# Patient Record
Sex: MALE | Race: WHITE | NOT HISPANIC OR LATINO | URBAN - METROPOLITAN AREA
[De-identification: names, ages, dates, MRNs, and addresses within clinical notes are randomized per-mention and may not be internally consistent; named-entity substitution may affect disease eponyms.]

---

## 2018-07-24 ENCOUNTER — EMERGENCY (EMERGENCY)
Facility: HOSPITAL | Age: 43
LOS: 1 days | Discharge: HOME | End: 2018-07-24
Attending: EMERGENCY MEDICINE | Admitting: EMERGENCY MEDICINE
Payer: COMMERCIAL

## 2018-07-24 VITALS
OXYGEN SATURATION: 100 % | HEART RATE: 71 BPM | SYSTOLIC BLOOD PRESSURE: 136 MMHG | RESPIRATION RATE: 16 BRPM | DIASTOLIC BLOOD PRESSURE: 90 MMHG

## 2018-07-24 VITALS
DIASTOLIC BLOOD PRESSURE: 98 MMHG | SYSTOLIC BLOOD PRESSURE: 168 MMHG | RESPIRATION RATE: 16 BRPM | TEMPERATURE: 98 F | HEART RATE: 83 BPM | OXYGEN SATURATION: 99 %

## 2018-07-24 LAB
ALBUMIN SERPL ELPH-MCNC: 4 G/DL — SIGNIFICANT CHANGE UP (ref 3.3–5)
ALP SERPL-CCNC: 62 U/L — SIGNIFICANT CHANGE UP (ref 40–120)
ALT FLD-CCNC: 23 U/L — SIGNIFICANT CHANGE UP (ref 4–41)
APTT BLD: 31.2 SEC — SIGNIFICANT CHANGE UP (ref 27.5–37.4)
AST SERPL-CCNC: 33 U/L — SIGNIFICANT CHANGE UP (ref 4–40)
BASOPHILS # BLD AUTO: 0.02 K/UL — SIGNIFICANT CHANGE UP (ref 0–0.2)
BASOPHILS NFR BLD AUTO: 0.3 % — SIGNIFICANT CHANGE UP (ref 0–2)
BILIRUB SERPL-MCNC: 0.4 MG/DL — SIGNIFICANT CHANGE UP (ref 0.2–1.2)
BLD GP AB SCN SERPL QL: NEGATIVE — SIGNIFICANT CHANGE UP
BUN SERPL-MCNC: 11 MG/DL — SIGNIFICANT CHANGE UP (ref 7–23)
CALCIUM SERPL-MCNC: 9.4 MG/DL — SIGNIFICANT CHANGE UP (ref 8.4–10.5)
CHLORIDE SERPL-SCNC: 100 MMOL/L — SIGNIFICANT CHANGE UP (ref 98–107)
CO2 SERPL-SCNC: 27 MMOL/L — SIGNIFICANT CHANGE UP (ref 22–31)
CREAT SERPL-MCNC: 1.15 MG/DL — SIGNIFICANT CHANGE UP (ref 0.5–1.3)
EOSINOPHIL # BLD AUTO: 0.17 K/UL — SIGNIFICANT CHANGE UP (ref 0–0.5)
EOSINOPHIL NFR BLD AUTO: 2.9 % — SIGNIFICANT CHANGE UP (ref 0–6)
GLUCOSE SERPL-MCNC: 85 MG/DL — SIGNIFICANT CHANGE UP (ref 70–99)
HCT VFR BLD CALC: 42.9 % — SIGNIFICANT CHANGE UP (ref 39–50)
HGB BLD-MCNC: 14.8 G/DL — SIGNIFICANT CHANGE UP (ref 13–17)
IMM GRANULOCYTES # BLD AUTO: 0.02 # — SIGNIFICANT CHANGE UP
IMM GRANULOCYTES NFR BLD AUTO: 0.3 % — SIGNIFICANT CHANGE UP (ref 0–1.5)
INR BLD: 0.97 — SIGNIFICANT CHANGE UP (ref 0.88–1.17)
LYMPHOCYTES # BLD AUTO: 1.33 K/UL — SIGNIFICANT CHANGE UP (ref 1–3.3)
LYMPHOCYTES # BLD AUTO: 23.1 % — SIGNIFICANT CHANGE UP (ref 13–44)
MCHC RBC-ENTMCNC: 30.6 PG — SIGNIFICANT CHANGE UP (ref 27–34)
MCHC RBC-ENTMCNC: 34.5 % — SIGNIFICANT CHANGE UP (ref 32–36)
MCV RBC AUTO: 88.8 FL — SIGNIFICANT CHANGE UP (ref 80–100)
MONOCYTES # BLD AUTO: 0.55 K/UL — SIGNIFICANT CHANGE UP (ref 0–0.9)
MONOCYTES NFR BLD AUTO: 9.5 % — SIGNIFICANT CHANGE UP (ref 2–14)
NEUTROPHILS # BLD AUTO: 3.68 K/UL — SIGNIFICANT CHANGE UP (ref 1.8–7.4)
NEUTROPHILS NFR BLD AUTO: 63.9 % — SIGNIFICANT CHANGE UP (ref 43–77)
NRBC # FLD: 0 — SIGNIFICANT CHANGE UP
PLATELET # BLD AUTO: 202 K/UL — SIGNIFICANT CHANGE UP (ref 150–400)
PMV BLD: 8.8 FL — SIGNIFICANT CHANGE UP (ref 7–13)
POTASSIUM SERPL-MCNC: 4.3 MMOL/L — SIGNIFICANT CHANGE UP (ref 3.5–5.3)
POTASSIUM SERPL-SCNC: 4.3 MMOL/L — SIGNIFICANT CHANGE UP (ref 3.5–5.3)
PROT SERPL-MCNC: 7 G/DL — SIGNIFICANT CHANGE UP (ref 6–8.3)
PROTHROM AB SERPL-ACNC: 11.1 SEC — SIGNIFICANT CHANGE UP (ref 9.8–13.1)
RBC # BLD: 4.83 M/UL — SIGNIFICANT CHANGE UP (ref 4.2–5.8)
RBC # FLD: 13.2 % — SIGNIFICANT CHANGE UP (ref 10.3–14.5)
RH IG SCN BLD-IMP: NEGATIVE — SIGNIFICANT CHANGE UP
SODIUM SERPL-SCNC: 141 MMOL/L — SIGNIFICANT CHANGE UP (ref 135–145)
WBC # BLD: 5.77 K/UL — SIGNIFICANT CHANGE UP (ref 3.8–10.5)
WBC # FLD AUTO: 5.77 K/UL — SIGNIFICANT CHANGE UP (ref 3.8–10.5)

## 2018-07-24 PROCEDURE — 76870 US EXAM SCROTUM: CPT | Mod: 26

## 2018-07-24 PROCEDURE — 99285 EMERGENCY DEPT VISIT HI MDM: CPT

## 2018-07-24 PROCEDURE — 99283 EMERGENCY DEPT VISIT LOW MDM: CPT

## 2018-07-24 NOTE — CONSULT NOTE ADULT - ASSESSMENT
43yoM with L hematocele s/p genital trauma 2/2 suction 2 weeks ago    -Awaiting call back from Dr. Hdz to ensure no further imaging is needed at this time 43yoM with L hematocele s/p genital trauma 2/2 suction 2 weeks ago    -No further imaging needed at this time  -Continue conservative management  -Antiinflammatories/pain meds as tolerated (patient has gastric ulcer)  -Scrotal support/scrotal elevation  -Follow up as outpatient with Dr. Hdz [phone] 43yoM with L hematocele s/p genital trauma 2/2 suction 2 weeks ago. no evidence of torsion based on blood flow observed in both testicles by sonogram    -No further imaging needed at this time  -Continue conservative management  -Antiinflammatories/pain meds as tolerated (patient has gastric ulcer)  -Scrotal support/scrotal elevation  -Follow up as outpatient with Dr. Hdz [phone]

## 2018-07-24 NOTE — ED ADULT TRIAGE NOTE - CHIEF COMPLAINT QUOTE
Pt states that [place] by urology and sent for US, urology feels like there is no blood flow to left testicle.  Pt states that he has no feeling in testicle

## 2018-07-24 NOTE — ED PROVIDER NOTE - MEDICAL DECISION MAKING DETAILS
kelsey: trauma to scrotum approx 2 weeks ago, seeking care now. large firm tender left testicle. bedside US requested.

## 2018-07-24 NOTE — CONSULT NOTE ADULT - SUBJECTIVE AND OBJECTIVE BOX
Patient is a 43yoM with no prior PMHx who presents today for evaluation after testicular trauma 2 weeks ago.  Patient states he was on his honeymoon in [place], and while swimming, the entirety of his penis and scrotum was sucked into the pool intake filter, and was stuck there for several minutes with suction.      Patient was seen by a physician in [place] who recommended scrotal exploration, but the patient refused.  He states he called a local urologist today, who told him to present to the ER out of concern for a dead testicle.  Patient has several pictures of the scrotum from immediately after the injury.  Pictures reviewed, significant scrotal swelling and ecchymosis.    U/s performed today demonstrates homogenous echotexture of the b/l testicles with normal blood flow, large L scrotal hematocele.    PAST MEDICAL & SURGICAL HISTORY: None    FAMILY HISTORY: Noncontributory    No Known Allergies    REVIEW OF SYSTEMS: Pertinent positives and negatives as stated in HPI, otherwise negative    Vital signs  T(C): 36.9, Max: 36.9 (07-24 @ 11:17)  HR: 71  BP: 136/90  SpO2: 100%    Physical Exam  Gen: NAD  Pulm: No respiratory distress, no subcostal retractions  CV: RRR, no JVD  : Patient refused penile exam.  L hemiscrotum markedly swollen compared to R, nontender, no ecchymosis.  Marked improvement when compared to pictures taken immediately after the incident.    LABS:  WBC 5.77  / Hct 42.9  / SCr 1.16     07-24  141  |  100  |  11  ----------------------------<  85  4.3   |  27  |  1.15    Ca    9.4      24 Jul 2018 13:01    TPro  7.0  /  Alb  4.0  /  TBili  0.4  /  DBili  x   /  AST  33  /  ALT  23  /  AlkPhos  62  07-24    PT/INR - ( 24 Jul 2018 12:57 )   PT: 11.1 SEC;   INR: 0.97      PTT - ( 24 Jul 2018 12:57 )  PTT:31.2 SEC    RADIOLOGY:  RIGHT:  Right testis: 4.0 x 3.0 x 2.7 cm. Normal echogenicity and echotexture   with no masses or areas of architectural distortion. Normal blood flow   pattern.  Right epididymis: Within normal limits.  Right hydrocele: None.  Right varicocele: None.      LEFT:   Left testis: 3.8 x 2.1 x 2.3 cm. Normal echogenicity and echotexture with   no masses or areas of architectural distortion. Normal blood flow pattern.  Left epididymis: Within normal limits.  Left hydrocele: Large  Left varicocele: None.  5.6 x 3.7 x 6.4 cm left scrotal hematoma is noted.    IMPRESSION:   No testicular torsion or laceration.  Large left scrotal hematoma. Patient is a 43yoM with no prior PMHx aside from self-reported gastric ulcers who presents today for evaluation after testicular trauma 2 weeks ago.  Patient states he was on his honeymoon in [place], and while swimming, the entirety of his penis and scrotum was sucked into the pool intake filter, and was stuck there for several minutes with suction.      Patient was seen by a physician in [place] who recommended scrotal exploration, but the patient refused.  He states he called a local urologist today, who told him to present to the ER out of concern for a dead testicle.  Patient has several pictures of the scrotum from immediately after the injury.  Pictures reviewed, significant scrotal swelling and ecchymosis.    U/s performed today demonstrates homogenous echotexture of the b/l testicles with normal blood flow, large L scrotal hematocele.    PAST MEDICAL & SURGICAL HISTORY: None    FAMILY HISTORY: Noncontributory    No Known Allergies    REVIEW OF SYSTEMS: Pertinent positives and negatives as stated in HPI, otherwise negative    Vital signs  T(C): 36.9, Max: 36.9 (07-24 @ 11:17)  HR: 71  BP: 136/90  SpO2: 100%    Physical Exam  Gen: NAD  Pulm: No respiratory distress, no subcostal retractions  CV: RRR, no JVD  : Patient refused penile exam.  L hemiscrotum markedly swollen compared to R, nontender, no ecchymosis.  Marked improvement when compared to pictures taken immediately after the incident.    LABS:  WBC 5.77  / Hct 42.9  / SCr 1.16     07-24  141  |  100  |  11  ----------------------------<  85  4.3   |  27  |  1.15    Ca    9.4      24 Jul 2018 13:01    TPro  7.0  /  Alb  4.0  /  TBili  0.4  /  DBili  x   /  AST  33  /  ALT  23  /  AlkPhos  62  07-24    PT/INR - ( 24 Jul 2018 12:57 )   PT: 11.1 SEC;   INR: 0.97      PTT - ( 24 Jul 2018 12:57 )  PTT:31.2 SEC    RADIOLOGY:  RIGHT:  Right testis: 4.0 x 3.0 x 2.7 cm. Normal echogenicity and echotexture   with no masses or areas of architectural distortion. Normal blood flow   pattern.  Right epididymis: Within normal limits.  Right hydrocele: None.  Right varicocele: None.      LEFT:   Left testis: 3.8 x 2.1 x 2.3 cm. Normal echogenicity and echotexture with   no masses or areas of architectural distortion. Normal blood flow pattern.  Left epididymis: Within normal limits.  Left hydrocele: Large  Left varicocele: None.  5.6 x 3.7 x 6.4 cm left scrotal hematoma is noted.    IMPRESSION:   No testicular torsion or laceration.  Large left scrotal hematoma.

## 2018-07-24 NOTE — ED PROVIDER NOTE - OBJECTIVE STATEMENT
kelsey: ely from pt. July 7 2018 while in [place], scrotum was trapped in an intake filter with suction for several minutes. progressively worsening pain, swelling and discoloration (pictures reviewed). Had a physician visit his hotel room twice; recc' going to hosp to cut the region'. pt refused. returned to [place] 3 [place] ago. called a urologist who recc that pt go to ED.  pmh unremarkable

## 2018-08-01 ENCOUNTER — EMERGENCY (EMERGENCY)
Facility: HOSPITAL | Age: 43
LOS: 1 days | Discharge: LEFT BEFORE DISCHARGE | End: 2018-08-01
Attending: EMERGENCY MEDICINE | Admitting: EMERGENCY MEDICINE
Payer: COMMERCIAL

## 2018-08-01 VITALS
RESPIRATION RATE: 16 BRPM | OXYGEN SATURATION: 100 % | SYSTOLIC BLOOD PRESSURE: 160 MMHG | TEMPERATURE: 100 F | HEART RATE: 89 BPM | DIASTOLIC BLOOD PRESSURE: 100 MMHG

## 2018-08-01 PROCEDURE — 99285 EMERGENCY DEPT VISIT HI MDM: CPT

## 2018-08-01 PROCEDURE — 76870 US EXAM SCROTUM: CPT | Mod: 26

## 2018-08-01 RX ORDER — IBUPROFEN 200 MG
800 TABLET ORAL ONCE
Qty: 0 | Refills: 0 | Status: COMPLETED | OUTPATIENT
Start: 2018-08-01 | End: 2018-08-01

## 2018-08-01 RX ADMIN — Medication 800 MILLIGRAM(S): at 23:11

## 2018-08-01 NOTE — ED PROVIDER NOTE - PROGRESS NOTE DETAILS
Bray: ua clean.  pending sono read.  pt states motrin helped with sx.  s/o to dr jaffe to f/u on sono read of testi and re-assess. likely dc home with viral syndrome. Received signout from Dr. jaffe pending urology consult.  Went to reevaluate, pt not in room.  Eloped.

## 2018-08-01 NOTE — ED ADULT TRIAGE NOTE - CHIEF COMPLAINT QUOTE
pt comes to ED for multiple medical complaints. pt was seen at [facility] last week for scrotal hematoma pt was supposed to follow up with urologist pt did not make an appt. pt comes to ED for muscle pain x 3 days sore throat and pain in the testicle and swelling pt has a low grade temp in triage. otherwise VSS NAD pt appears comfortable

## 2018-08-01 NOTE — ED PROVIDER NOTE - GENITOURINARY, MLM
No discharge, lesions. normal appearing right scrotum.  left scrotum firm to touch, minimal pain, swollen but significantly improve per pt. no underlying skin changes, no cellulitis, ttp at left spermatid cord region

## 2018-08-01 NOTE — ED PROVIDER NOTE - ENMT, MLM
Airway patent, Nasal mucosa clear. Mouth with normal mucosa. Throat has no vesicles, no oropharyngeal exudates and uvula is midline. no candida

## 2018-08-01 NOTE — ED PROVIDER NOTE - MEDICAL DECISION MAKING DETAILS
43 yr old homosexual male with hx of L hematocele s/p genital trauma 2/2 suction 3 weeks ago presents to ed c/o subjective fever, chills, myalgia and fatigue x 3 days gradually worsening. single partner, no hx of sti or hiv, no rashes, no abd pain, no n/v, no cough, no sob, no cp.  mild throat irritation. no dysuria or hematuria. went to urgent care and told to come to ed    pt with constitutional sx likely viral r/o uti vs epididymitis.  pt and  states no hiv or sti.  no concern for hans's.

## 2018-08-01 NOTE — ED PROVIDER NOTE - OBJECTIVE STATEMENT
43 yr old homosexual male with hx of L hematocele s/p genital trauma 2/2 suction 3 weeks ago presents to ed c/o subjective fever, chills, myalgia and fatigue x 3 days gradually worsening. single partner, no hx of sti or hiv, no rashes, no abd pain, no n/v, no cough, no sob, no cp.  mild throat irritation. no dysuria or hematuria. went to urgent care and told to come to ed

## 2018-08-02 LAB
APPEARANCE UR: CLEAR — SIGNIFICANT CHANGE UP
BILIRUB UR-MCNC: NEGATIVE — SIGNIFICANT CHANGE UP
BLOOD UR QL VISUAL: NEGATIVE — SIGNIFICANT CHANGE UP
COLOR SPEC: YELLOW — SIGNIFICANT CHANGE UP
GLUCOSE UR-MCNC: NEGATIVE — SIGNIFICANT CHANGE UP
KETONES UR-MCNC: NEGATIVE — SIGNIFICANT CHANGE UP
LEUKOCYTE ESTERASE UR-ACNC: NEGATIVE — SIGNIFICANT CHANGE UP
MUCOUS THREADS # UR AUTO: SIGNIFICANT CHANGE UP
NITRITE UR-MCNC: NEGATIVE — SIGNIFICANT CHANGE UP
NON-SQ EPI CELLS # UR AUTO: <1 — SIGNIFICANT CHANGE UP
PH UR: 6 — SIGNIFICANT CHANGE UP (ref 4.6–8)
PROT UR-MCNC: 20 MG/DL — SIGNIFICANT CHANGE UP
RBC CASTS # UR COMP ASSIST: SIGNIFICANT CHANGE UP (ref 0–?)
SP GR SPEC: 1.02 — SIGNIFICANT CHANGE UP (ref 1–1.04)
SQUAMOUS # UR AUTO: SIGNIFICANT CHANGE UP
UROBILINOGEN FLD QL: NORMAL MG/DL — SIGNIFICANT CHANGE UP
WBC UR QL: SIGNIFICANT CHANGE UP (ref 0–?)

## 2018-08-02 NOTE — CHART NOTE - NSCHARTNOTEFT_GEN_A_CORE
Urology consulted for evaluation of testicular/scrotal hematoma. Upon arrival to emergency department patient was nowhere to be found. Efforts made by myself and the ED staff to locate the patient were unfruitful. It appears likely that the patient has left AMA. Will re-evaluate if patient is located.

## 2018-08-03 LAB
BACTERIA UR CULT: SIGNIFICANT CHANGE UP
SPECIMEN SOURCE: SIGNIFICANT CHANGE UP

## 2022-08-29 NOTE — ED ADULT TRIAGE NOTE - PAIN: PRESENCE, MLM
SW attempted to reach pt again this date to offer support/ assistance but SW had to leave a message  SW has left [place] Text for Marcella LOPEZ/CM at [facility] re same  SW to also send and out reach letter to pt and will remain available should pt need or desire assistance  complains of pain/discomfort

## 2022-10-31 ENCOUNTER — NON-APPOINTMENT (OUTPATIENT)
Age: 47
End: 2022-10-31

## 2022-12-11 ENCOUNTER — NON-APPOINTMENT (OUTPATIENT)
Age: 47
End: 2022-12-11

## 2022-12-29 ENCOUNTER — NON-APPOINTMENT (OUTPATIENT)
Age: 47
End: 2022-12-29

## 2024-03-06 ENCOUNTER — APPOINTMENT (OUTPATIENT)
Dept: INFECTIOUS DISEASE | Facility: CLINIC | Age: 49
End: 2024-03-06

## 2024-03-21 ENCOUNTER — OUTPATIENT (OUTPATIENT)
Dept: OUTPATIENT SERVICES | Facility: HOSPITAL | Age: 49
LOS: 1 days | End: 2024-03-21
Payer: COMMERCIAL

## 2024-03-21 ENCOUNTER — APPOINTMENT (OUTPATIENT)
Dept: INFECTIOUS DISEASE | Facility: CLINIC | Age: 49
End: 2024-03-21
Payer: COMMERCIAL

## 2024-03-21 VITALS
OXYGEN SATURATION: 99 % | DIASTOLIC BLOOD PRESSURE: 86 MMHG | WEIGHT: 180 LBS | HEART RATE: 84 BPM | SYSTOLIC BLOOD PRESSURE: 128 MMHG | TEMPERATURE: 98.1 F | BODY MASS INDEX: 26.66 KG/M2 | HEIGHT: 69 IN

## 2024-03-21 DIAGNOSIS — F32.0 MAJOR DEPRESSIVE DISORDER, SINGLE EPISODE, MILD: ICD-10-CM

## 2024-03-21 DIAGNOSIS — F17.290 NICOTINE DEPENDENCE, OTHER TOBACCO PRODUCT, UNCOMPLICATED: ICD-10-CM

## 2024-03-21 DIAGNOSIS — Z78.9 OTHER SPECIFIED HEALTH STATUS: ICD-10-CM

## 2024-03-21 DIAGNOSIS — Z92.89 PERSONAL HISTORY OF OTHER MEDICAL TREATMENT: ICD-10-CM

## 2024-03-21 DIAGNOSIS — F41.9 ANXIETY DISORDER, UNSPECIFIED: ICD-10-CM

## 2024-03-21 DIAGNOSIS — B35.1 TINEA UNGUIUM: ICD-10-CM

## 2024-03-21 DIAGNOSIS — Z87.891 PERSONAL HISTORY OF NICOTINE DEPENDENCE: ICD-10-CM

## 2024-03-21 DIAGNOSIS — B37.0 CANDIDAL STOMATITIS: ICD-10-CM

## 2024-03-21 DIAGNOSIS — Z01.00 ENCOUNTER FOR EXAMINATION OF EYES AND VISION W/OUT ABNORMAL FINDINGS: ICD-10-CM

## 2024-03-21 PROCEDURE — ZZZZZ: CPT

## 2024-03-21 PROCEDURE — G0463: CPT

## 2024-03-21 RX ORDER — ESOMEPRAZOLE MAGNESIUM 20 MG/1
20 CAPSULE, DELAYED RELEASE ORAL DAILY
Refills: 0 | Status: ACTIVE | COMMUNITY
Start: 2024-03-21

## 2024-03-22 LAB
25(OH)D3 SERPL-MCNC: 68.1 NG/ML
ESTIMATED AVERAGE GLUCOSE: 100 MG/DL
HBA1C MFR BLD HPLC: 5.1 %
HCV AB SER QL: NONREACTIVE
HCV S/CO RATIO: 0.16 S/CO
PSA SERPL-MCNC: 1.55 NG/ML

## 2024-03-25 ENCOUNTER — NON-APPOINTMENT (OUTPATIENT)
Age: 49
End: 2024-03-25

## 2024-03-25 DIAGNOSIS — B20 HUMAN IMMUNODEFICIENCY VIRUS [HIV] DISEASE: ICD-10-CM

## 2024-03-25 PROBLEM — F32.0 CURRENT MILD EPISODE OF MAJOR DEPRESSIVE DISORDER, UNSPECIFIED WHETHER RECURRENT: Status: ACTIVE | Noted: 2024-03-25

## 2024-03-25 PROBLEM — F41.9 ANXIETY: Status: ACTIVE | Noted: 2024-03-25

## 2024-03-25 LAB
C TRACH RRNA SPEC QL NAA+PROBE: NOT DETECTED
HEPB DNA PCR INT: NOT DETECTED
HEPB DNA PCR LOG: NOT DETECTED LOGIU/ML
MEV IGG FLD QL IA: 10.3 AU/ML
MEV IGG+IGM SER-IMP: NEGATIVE
MUV AB SER-ACNC: NEGATIVE
MUV IGG SER QL IA: 8.9 AU/ML
N GONORRHOEA RRNA SPEC QL NAA+PROBE: NOT DETECTED
RUBV IGG FLD-ACNC: 1.9 INDEX
RUBV IGG SER-IMP: POSITIVE
SOURCE AMPLIFICATION: NORMAL
SOURCE AMPLIFICATION: NORMAL
SOURCE ANAL: NORMAL
SOURCE ORAL: NORMAL
T PALLIDUM AB SER QL IA: NEGATIVE
T VAGINALIS RRNA SPEC QL NAA+PROBE: NOT DETECTED
VZV AB TITR SER: POSITIVE
VZV IGG SER IF-ACNC: 2249 INDEX

## 2024-03-25 NOTE — REASON FOR VISIT
[Initial Eval - Existing Diagnosis] : an initial evaluation of an existing diagnosis [FreeTextEntry1] : HIV initial consult/ return to care

## 2024-03-25 NOTE — HISTORY OF PRESENT ILLNESS
[FreeTextEntry1] : 50 yo male here for HIV intitial consult/ return to care  was dx over 20 years ago hx of KS, AIDs on dx.  he was started on medication and returned to care  the last medication he was taking was Atripla [facility] he ran out of medication one year ago- fell out of care when he lost his job and insurance.   pt noted with onychomycosis bilat feet.   would like treatment  hx of depression for the past year very active used friend's Buspar with good result   PMH : Dx 20 years ago- hx of KS, AIDs,  hx of anxiety and depression, lumbar stenosis, right side sciatica, asthma, gastric ulcer.  denies any recent hospitalizations, blood transfusions, travel PCP : none  Vaccines : COVID Pfizer x2  Declines influenza vaccines PSH : lumbar sx, polyp - pre-CA in colon,  PFH : Father- depression,   Mother - HTN Social hx : confirms IVDU - ecstasy, ketamine, coke, GHB- last use one year ago.  confirms vaping, denies any cigarettes - quit 10 years ago, occasional ETOH, confirms tattoos  Sexual hx : MSM.  not sexually active for one year.  hx of anal HPV.  denies any hx of STI.   Partner : Male. spouse.  negative HIV.  On PrEP Occupation : unemployed.   Lives with : spouse Who aware of HIV status : sister.   NKDA     3/21/2024 Plan  HIV  Counselled on Biktary - dosing, risks, benefits, SE, long term effects, monitoring.   1. start Biktarvy one tab daily  2. do blood work  3. f/u one month   Onychomycosis  1. referral to podiatry given   Depression/ Anxiety  1. refer to SW and then psychiatry for further evaluation and management   Thrush  1. start Nystatin swish and spit/swallow QID

## 2024-03-25 NOTE — PHYSICAL EXAM
[General Appearance - Alert] : alert [General Appearance - In No Acute Distress] : in no acute distress [General Appearance - Well Nourished] : well nourished [General Appearance - Well-Appearing] : healthy appearing [Sclera] : the sclera and conjunctiva were normal [PERRL With Normal Accommodation] : pupils were equal in size, round, reactive to light [Extraocular Movements] : extraocular movements were intact [Outer Ear] : the ears and nose were normal in appearance [Hearing Threshold Finger Rub [place]] : hearing was normal [Examination Of The Oral Cavity] : the lips and gums were normal [Both Tympanic Membranes Were Examined] : both tympanic membranes were normal [Neck Appearance] : the appearance of the neck was normal [Neck Cervical Mass (___cm)] : no neck mass was observed [Jugular Venous Distention Increased] : there was no jugular-venous distention [Thyroid Diffuse Enlargement] : the thyroid was not enlarged [Respiration, Rhythm And Depth] : normal respiratory rhythm and effort [Exaggerated Use Of Accessory Muscles For Inspiration] : no accessory muscle use [Auscultation Breath Sounds / Voice Sounds] : lungs were clear to auscultation bilaterally [Heart Rate And Rhythm] : heart rate was normal and rhythm regular [Heart Sounds] : normal S1 and S2 [Heart Sounds Gallop] : no gallops [Murmurs] : no murmurs [Heart Sounds Pericardial Friction Rub] : no pericardial rub [Edema] : there was no peripheral edema [Bowel Sounds] : normal bowel sounds [Abdomen Soft] : soft [Abdomen Tenderness] : non-tender [Costovertebral Angle Tenderness] : no CVA tenderness [FreeTextEntry1] : mild lymphadenopathy [Musculoskeletal - Swelling] : no joint swelling [Range of Motion to Joints] : range of motion to joints [Nail Clubbing] : no clubbing  or cyanosis of the fingernails [Skin Color & Pigmentation] : normal skin color and pigmentation [Motor Tone] : muscle strength and tone were normal [] : no rash [Skin Lesions] : no skin lesions [Cranial Nerves] : cranial nerves 2-12 were intact [Sensation] : the sensory exam was normal to light touch and pinprick [Motor Exam] : the motor exam was normal [No Focal Deficits] : no focal deficits [Oriented To Time, Place, And Person] : oriented to person, place, and time [Affect] : the affect was normal

## 2024-03-25 NOTE — ASSESSMENT
[FreeTextEntry1] : HIV initial consult / return to care.   3/21/2024 Plan  HIV  Counselled on Biktary - dosing, risks, benefits, SE, long term effects, monitoring.   1. start Biktarvy one tab daily  2. do blood work  3. f/u one month   Onychomycosis  1. referral to podiatry given   Depression/ Anxiety  1. refer to SW and then psychiatry for further evaluation and management   Thrush  1. start Nystatin swish and spit/swallow QID [Treatment Education] : treatment education [Treatment Adherence] : treatment adherence [Rx Dose / Side Effects] : Rx dose/side effects [Risk Reduction] : risk reduction [Medical Care Issues] : medical care issues [HIV Education] : HIV Education [Drug Interactions / Side Effects] : drug interactions/side effects [Sexuality / Safer Sex] : sexuality/safer sex

## 2024-03-26 DIAGNOSIS — F41.9 ANXIETY DISORDER, UNSPECIFIED: ICD-10-CM

## 2024-03-26 DIAGNOSIS — B37.0 CANDIDAL STOMATITIS: ICD-10-CM

## 2024-03-26 DIAGNOSIS — F32.0 MAJOR DEPRESSIVE DISORDER, SINGLE EPISODE, MILD: ICD-10-CM

## 2024-03-26 DIAGNOSIS — B35.1 TINEA UNGUIUM: ICD-10-CM

## 2024-03-27 LAB — HLX HLA B57-01 ANTIGEN FINAL REPORT: NORMAL

## 2024-04-01 LAB
HIV GENOSURE PRIME INTERP: NORMAL
HIV GENOSURE PRIME1: NORMAL
HIV GENOSURE PRIME2: NORMAL

## 2024-04-17 ENCOUNTER — NON-APPOINTMENT (OUTPATIENT)
Age: 49
End: 2024-04-17

## 2024-04-25 ENCOUNTER — OUTPATIENT (OUTPATIENT)
Dept: OUTPATIENT SERVICES | Facility: HOSPITAL | Age: 49
LOS: 1 days | End: 2024-04-25
Payer: COMMERCIAL

## 2024-04-25 ENCOUNTER — APPOINTMENT (OUTPATIENT)
Dept: INFECTIOUS DISEASE | Facility: CLINIC | Age: 49
End: 2024-04-25
Payer: COMMERCIAL

## 2024-04-25 VITALS
HEART RATE: 80 BPM | DIASTOLIC BLOOD PRESSURE: 84 MMHG | TEMPERATURE: 97.8 F | BODY MASS INDEX: 27.25 KG/M2 | HEIGHT: 69 IN | OXYGEN SATURATION: 96 % | WEIGHT: 184 LBS | SYSTOLIC BLOOD PRESSURE: 125 MMHG

## 2024-04-25 DIAGNOSIS — B20 HUMAN IMMUNODEFICIENCY VIRUS [HIV] DISEASE: ICD-10-CM

## 2024-04-25 PROCEDURE — ZZZZZ: CPT

## 2024-04-25 PROCEDURE — G0463: CPT

## 2024-04-25 RX ORDER — NYSTATIN 100000 [USP'U]/ML
100000 SUSPENSION ORAL 4 TIMES DAILY
Qty: 600 | Refills: 6 | Status: ACTIVE | COMMUNITY
Start: 2024-03-21 | End: 1900-01-01

## 2024-04-25 RX ORDER — BICTEGRAVIR SODIUM, EMTRICITABINE, AND TENOFOVIR ALAFENAMIDE FUMARATE 50; 200; 25 MG/1; MG/1; MG/1
50-200-25 TABLET ORAL
Qty: 30 | Refills: 4 | Status: ACTIVE | COMMUNITY
Start: 2024-03-21 | End: 1900-01-01

## 2024-04-26 NOTE — HISTORY OF PRESENT ILLNESS
[FreeTextEntry1] : 48 yo male here for HIV f/u consult and results discussion.  taking Biktarvy daily with no missed doses or SE ran out 2 days ago due to did not  from pharmacy- was unsure if had refills.  denies any c/o at this time     From previous consult 3/21/2024 :  48 yo male here for HIV intitial consult/ return to care was dx over 20 years ago hx of KS, AIDs on dx. he was started on medication and returned to care the last medication he was taking was Atripla [facility] he ran out of medication one year ago- fell out of care when he lost his job and insurance.  pt noted with onychomycosis bilat feet. would like treatment  hx of depression for the past year very active used friend's Buspar with good result  PMH : Dx 20 years ago- hx of KS, AIDs, hx of anxiety and depression, lumbar stenosis, right side sciatica, asthma, gastric ulcer. denies any recent hospitalizations, blood transfusions, travel PCP : none Vaccines : COVID Pfizer x2 Declines influenza vaccines PSH : lumbar sx, polyp - pre-CA in colon, PFH : Father- depression, Mother - HTN Social hx : confirms IVDU - ecstasy, ketamine, coke, GHB- last use one year ago. confirms vaping, denies any cigarettes - quit 10 years ago, occasional ETOH, confirms tattoos Sexual hx : MSM. not sexually active for one year. hx of anal HPV. denies any hx of STI. Partner : Male. spouse. negative HIV. On PrEP Occupation : unemployed. Lives with : spouse Who aware of HIV status : sister.  NKDA     4/25/2024 Plan  HIV  all test results from previous consult reviewed with patient.  Counselled on compliance and notify this office if difficulties occur with medication 1. continue current treatment - refills given to new pharmacy  2. repeat HIV VL  3. f/u 3 months

## 2024-04-26 NOTE — PHYSICAL EXAM
[General Appearance - Alert] : alert [General Appearance - In No Acute Distress] : in no acute distress [General Appearance - Well Nourished] : well nourished [General Appearance - Well-Appearing] : healthy appearing [Sclera] : the sclera and conjunctiva were normal [PERRL With Normal Accommodation] : pupils were equal in size, round, reactive to light [Extraocular Movements] : extraocular movements were intact [Outer Ear] : the ears and nose were normal in appearance [Hearing Threshold Finger Rub Not [place]] : hearing was normal [Examination Of The Oral Cavity] : the lips and gums were normal [Both Tympanic Membranes Were Examined] : both tympanic membranes were normal [Neck Appearance] : the appearance of the neck was normal [Neck Cervical Mass (___cm)] : no neck mass was observed [Jugular Venous Distention Increased] : there was no jugular-venous distention [Thyroid Diffuse Enlargement] : the thyroid was not enlarged [Respiration, Rhythm And Depth] : normal respiratory rhythm and effort [Exaggerated Use Of Accessory Muscles For Inspiration] : no accessory muscle use [Auscultation Breath Sounds / Voice Sounds] : lungs were clear to auscultation bilaterally [Heart Rate And Rhythm] : heart rate was normal and rhythm regular [Heart Sounds] : normal S1 and S2 [Heart Sounds Gallop] : no gallops [Murmurs] : no murmurs [Heart Sounds Pericardial Friction Rub] : no pericardial rub [Edema] : there was no peripheral edema [Bowel Sounds] : normal bowel sounds [Abdomen Soft] : soft [Abdomen Tenderness] : non-tender [Costovertebral Angle Tenderness] : no CVA tenderness [FreeTextEntry1] : mild lymphadenopathy [Musculoskeletal - Swelling] : no joint swelling [Range of Motion to Joints] : range of motion to joints [Nail Clubbing] : no clubbing  or cyanosis of the fingernails [Motor Tone] : muscle strength and tone were normal [Skin Color & Pigmentation] : normal skin color and pigmentation [] : no rash [Skin Lesions] : no skin lesions [Cranial Nerves] : cranial nerves 2-12 were intact [Sensation] : the sensory exam was normal to light touch and pinprick [Motor Exam] : the motor exam was normal [No Focal Deficits] : no focal deficits [Oriented To Time, Place, And Person] : oriented to person, place, and time [Affect] : the affect was normal

## 2024-04-26 NOTE — ASSESSMENT
[FreeTextEntry1] : HIV f/u consult and results discussion.    4/25/2024 Plan  HIV  all test results from previous consult reviewed with patient.  Counselled on compliance and notify this office if difficulties occur with medication 1. continue current treatment - refills given to new pharmacy  2. repeat HIV VL  3. f/u 3 months   [Treatment Education] : treatment education [Treatment Adherence] : treatment adherence [HIV Education] : HIV Education

## 2024-05-08 ENCOUNTER — NON-APPOINTMENT (OUTPATIENT)
Age: 49
End: 2024-05-08

## 2024-07-25 ENCOUNTER — APPOINTMENT (OUTPATIENT)
Dept: INFECTIOUS DISEASE | Facility: CLINIC | Age: 49
End: 2024-07-25

## 2024-07-25 VITALS
HEIGHT: 69 IN | DIASTOLIC BLOOD PRESSURE: 84 MMHG | HEART RATE: 90 BPM | TEMPERATURE: 97.7 F | OXYGEN SATURATION: 99 % | BODY MASS INDEX: 27.11 KG/M2 | WEIGHT: 183 LBS | SYSTOLIC BLOOD PRESSURE: 134 MMHG

## 2024-07-25 DIAGNOSIS — B20 HUMAN IMMUNODEFICIENCY VIRUS [HIV] DISEASE: ICD-10-CM

## 2024-07-25 DIAGNOSIS — Z01.00 ENCOUNTER FOR EXAMINATION OF EYES AND VISION W/OUT ABNORMAL FINDINGS: ICD-10-CM

## 2024-07-25 DIAGNOSIS — J45.31 MILD PERSISTENT ASTHMA WITH (ACUTE) EXACERBATION: ICD-10-CM

## 2024-07-25 DIAGNOSIS — Z92.89 PERSONAL HISTORY OF OTHER MEDICAL TREATMENT: ICD-10-CM

## 2024-07-25 DIAGNOSIS — M25.50 PAIN IN UNSPECIFIED JOINT: ICD-10-CM

## 2024-07-25 DIAGNOSIS — B35.1 TINEA UNGUIUM: ICD-10-CM

## 2024-07-25 DIAGNOSIS — B35.3 TINEA PEDIS: ICD-10-CM

## 2024-07-25 PROCEDURE — ZZZZZ: CPT

## 2024-07-25 RX ORDER — IBUPROFEN AND FAMOTIDINE 26.6; 8 MG/1; MG/1
800-26.6 TABLET, COATED ORAL DAILY
Qty: 30 | Refills: 6 | Status: ACTIVE | COMMUNITY
Start: 2024-07-25 | End: 1900-01-01

## 2024-07-25 RX ORDER — ALBUTEROL SULFATE 90 UG/1
108 (90 BASE) INHALANT RESPIRATORY (INHALATION)
Qty: 1 | Refills: 6 | Status: ACTIVE | COMMUNITY
Start: 2024-07-25 | End: 1900-01-01

## 2024-07-25 RX ORDER — CLOTRIMAZOLE 10 MG/G
1 CREAM TOPICAL
Qty: 1 | Refills: 6 | Status: ACTIVE | COMMUNITY
Start: 2024-07-25 | End: 1900-01-01

## 2024-07-26 ENCOUNTER — NON-APPOINTMENT (OUTPATIENT)
Age: 49
End: 2024-07-26

## 2024-07-26 LAB
ALBUMIN SERPL ELPH-MCNC: 4.4 G/DL
ALP BLD-CCNC: 75 U/L
ALT SERPL-CCNC: 23 U/L
ANION GAP SERPL CALC-SCNC: 10 MMOL/L
AST SERPL-CCNC: 26 U/L
BILIRUB SERPL-MCNC: 0.5 MG/DL
BUN SERPL-MCNC: 13 MG/DL
CALCIUM SERPL-MCNC: 9.4 MG/DL
CD3 CELLS # BLD: 977 CELLS/UL
CD3 CELLS NFR BLD: 86 %
CD3+CD4+ CELLS # BLD: 388 CELLS/UL
CD3+CD4+ CELLS NFR BLD: 34 %
CD3+CD4+ CELLS/CD3+CD8+ CLL SPEC: 0.68 RATIO
CD3+CD8+ CELLS # SPEC: 573 CELLS/UL
CD3+CD8+ CELLS NFR BLD: 51 %
CHLORIDE SERPL-SCNC: 100 MMOL/L
CO2 SERPL-SCNC: 28 MMOL/L
CREAT SERPL-MCNC: 1.23 MG/DL
EGFR: 73 ML/MIN/1.73M2
GLUCOSE SERPL-MCNC: 89 MG/DL
HCT VFR BLD CALC: 46.3 %
HGB BLD-MCNC: 16 G/DL
MCHC RBC-ENTMCNC: 30.5 PG
MCHC RBC-ENTMCNC: 34.6 GM/DL
MCV RBC AUTO: 88.2 FL
PLATELET # BLD AUTO: 267 K/UL
POTASSIUM SERPL-SCNC: 4.2 MMOL/L
PROT SERPL-MCNC: 7 G/DL
RBC # BLD: 5.25 M/UL
RBC # FLD: 12.9 %
SODIUM SERPL-SCNC: 138 MMOL/L
WBC # FLD AUTO: 4.89 K/UL

## 2024-07-26 RX ORDER — IBUPROFEN 800 MG/1
800 TABLET, FILM COATED ORAL TWICE DAILY
Qty: 40 | Refills: 1 | Status: ACTIVE | COMMUNITY
Start: 2024-07-26 | End: 1900-01-01

## 2024-07-26 RX ORDER — MONTELUKAST 10 MG/1
10 TABLET, FILM COATED ORAL
Qty: 90 | Refills: 1 | Status: ACTIVE | COMMUNITY
Start: 2024-07-26 | End: 1900-01-01

## 2024-07-26 RX ORDER — FAMOTIDINE 20 MG/1
20 TABLET, FILM COATED ORAL
Qty: 30 | Refills: 5 | Status: ACTIVE | COMMUNITY
Start: 2024-07-26 | End: 1900-01-01

## 2024-07-26 NOTE — HISTORY OF PRESENT ILLNESS
[FreeTextEntry1] : 50 yo male here for HIV f/u consult  taking Biktarvy daily with no missed doses or SE  noted with growths on bilat wrists  confirms tenderness  states he "pops" the growth back in with good result  confirms swelling of elbows and forearms denies taking any medications for this.   pt noted with fungal infection of feet would like treatment for this.   pt very excited regarding new job working from home and supervisors are friends of his  Sexual hx : currently sexually active with spouse only.     From previous consult 4/25/2024 :  50 yo male here for HIV f/u consult and results discussion. taking Biktarvy daily with no missed doses or SE ran out 2 days ago due to did not  from pharmacy- was unsure if had refills. denies any c/o at this time    From previous consult 3/21/2024 : 50 yo male here for HIV intitial consult/ return to care was dx over 20 years ago hx of KS, AIDs on dx. he was started on medication and returned to care the last medication he was taking was Atripla was last provider was at [facility] he ran out of medication one year ago- fell out of care when he lost his job and insurance.  pt noted with onychomycosis bilat feet. would like treatment  hx of depression for the past year very active used friend's Buspar with good result  PMH : Dx 20 years ago- hx of KS, AIDs, hx of anxiety and depression, lumbar stenosis, right side sciatica, asthma, gastric ulcer. denies any recent hospitalizations, blood transfusions, travel PCP : none Vaccines : COVID Pfizer x2 Declines influenza vaccines PSH : lumbar sx, polyp - pre-CA in colon, PFH : Father- depression, Mother - HTN Social hx : confirms IVDU - ecstasy, ketamine, coke, GHB- last use one year ago. confirms vaping, denies any cigarettes - quit 10 years ago, occasional ETOH, confirms tattoos Sexual hx : MSM. not sexually active for one year. hx of anal HPV. denies any hx of STI. Partner : Male. spouse. negative HIV. On PrEP Occupation : unemployed. Lives with : spouse Who aware of HIV status : sister.  NKDA    7/25/2024 Plan  HIV  1. continue current treatment - refills given  2. do blood work  3. f/u 3 months as per pt request 4. will defer MMR vaccine for now  Onychomycosis  1. referral to podiatry given   Tinea pedis 1. start Clotrimazole cream BID 2. keep feet open to air and wear socks with shoes  Arthralgia noted with various joint pains, inflammation and tenderness of bilat wrists and left elbow noted.   1. referral to orthopedist given  Asthma noted with use of Ventolin Inhaler daily.  1. start Montelukast one tab daily  2. refill of Ventolin HFA given

## 2024-07-26 NOTE — HISTORY OF PRESENT ILLNESS
[FreeTextEntry1] : 48 yo male here for HIV f/u consult  taking Biktarvy daily with no missed doses or SE  noted with growths on bilat wrists  confirms tenderness  states he "pops" the growth back in with good result  confirms swelling of elbows and forearms denies taking any medications for this.   pt noted with fungal infection of feet would like treatment for this.   pt very excited regarding new job working from home and supervisors are friends of his  Sexual hx : currently sexually active with spouse only.     From previous consult 4/25/2024 :  48 yo male here for HIV f/u consult and results discussion. taking Biktarvy daily with no missed doses or SE ran out 2 days ago due to did not  from pharmacy- was unsure if had refills. denies any c/o at this time    From previous consult 3/21/2024 : 48 yo male here for HIV intitial consult/ return to care was dx over 20 years ago hx of [place], AIDs on dx. he was started on medication and returned to care the last medication he was taking was Atripla was last provider was at [facility] he ran out of medication one year ago- fell out of care when he lost his job and insurance.  pt noted with onychomycosis bilat feet. would like treatment  hx of depression for the past year very active used friend's Buspar with good result  PMH : Dx 20 years ago- hx of [place], AIDs, hx of anxiety and depression, lumbar stenosis, right side sciatica, asthma, gastric ulcer. denies any recent hospitalizations, blood transfusions, travel PCP : none Vaccines : COVID Pfizer x2 Declines influenza vaccines PSH : lumbar sx, polyp - pre-CA in colon, PFH : Father- depression, Mother - HTN Social hx : confirms IVDU - ecstasy, ketamine, coke, GHB- last use one year ago. confirms vaping, denies any cigarettes - quit 10 years ago, occasional ETOH, confirms tattoos Sexual hx : MSM. not sexually active for one year. hx of anal HPV. denies any hx of STI. Partner : Male. spouse. negative HIV. On PrEP Occupation : unemployed. Lives with : spouse Who aware of HIV status : sister.  NKDA    7/25/2024 Plan  HIV  1. continue current treatment - refills given  2. do blood work  3. f/u 3 months as per pt request 4. will defer MMR vaccine for now  Onychomycosis  1. referral to podiatry given   Tinea pedis 1. start Clotrimazole cream BID 2. keep feet open to air and wear socks with shoes  Arthralgia noted with various joint pains, inflammation and tenderness of bilat wrists and left elbow noted.   1. referral to orthopedist given  Asthma noted with use of Ventolin Inhaler daily.  1. start Montelukast one tab daily  2. refill of Ventolin HFA given

## 2024-07-26 NOTE — PHYSICAL EXAM
Called and spoke to patient . Patient would like to discontinue Humira. Patient states he has had no flares /arthiritis pains for 2 years now  With normal labs. Patient states next dose of Humira will be on the 16th.  Patient scheduled nextt office visit on 4/24/2023.     Please advise     Thank you    [General Appearance - Alert] : alert [General Appearance - In No Acute Distress] : in no acute distress [General Appearance - Well Nourished] : well nourished [General Appearance - Well-Appearing] : healthy appearing [Sclera] : the sclera and conjunctiva were normal [PERRL With Normal Accommodation] : pupils were equal in size, round, reactive to light [Extraocular Movements] : extraocular movements were intact [Outer Ear] : the ears and nose were normal in appearance [Hearing Threshold Finger Rub Not [place]] : hearing was normal [Examination Of The Oral Cavity] : the lips and gums were normal [Both Tympanic Membranes Were Examined] : both tympanic membranes were normal [Neck Appearance] : the appearance of the neck was normal [Neck Cervical Mass (___cm)] : no neck mass was observed [Jugular Venous Distention Increased] : there was no jugular-venous distention [Thyroid Diffuse Enlargement] : the thyroid was not enlarged [Respiration, Rhythm And Depth] : normal respiratory rhythm and effort [Exaggerated Use Of Accessory Muscles For Inspiration] : no accessory muscle use [Auscultation Breath Sounds / Voice Sounds] : lungs were clear to auscultation bilaterally [Heart Rate And Rhythm] : heart rate was normal and rhythm regular [Heart Sounds] : normal S1 and S2 [Heart Sounds Gallop] : no gallops [Murmurs] : no murmurs [Heart Sounds Pericardial Friction Rub] : no pericardial rub [Edema] : there was no peripheral edema [Bowel Sounds] : normal bowel sounds [Abdomen Soft] : soft [Abdomen Tenderness] : non-tender [Costovertebral Angle Tenderness] : no CVA tenderness [Range of Motion to Joints] : range of motion to joints [Nail Clubbing] : no clubbing  or cyanosis of the fingernails [Motor Tone] : muscle strength and tone were normal [Skin Color & Pigmentation] : normal skin color and pigmentation [] : no rash [Skin Lesions] : no skin lesions [FreeTextEntry1] : noted with peeling and mildly excoriated skin between toes bilat and plantar feet.  Onychomycosis noted of right foot first digit.   [Cranial Nerves] : cranial nerves 2-12 were intact [Sensation] : the sensory exam was normal to light touch and pinprick [Motor Exam] : the motor exam was normal [No Focal Deficits] : no focal deficits [Oriented To Time, Place, And Person] : oriented to person, place, and time [Affect] : the affect was normal

## 2024-07-26 NOTE — REVIEW OF SYSTEMS
[Negative] : Heme/Lymph [As Noted in HPI] : as noted in HPI [Joint Pain] : joint pain [Joint Swelling] : joint swelling [de-identified] : onychomycosis

## 2024-07-26 NOTE — REVIEW OF SYSTEMS
[Negative] : Heme/Lymph [As Noted in HPI] : as noted in HPI [Joint Pain] : joint pain [Joint Swelling] : joint swelling [de-identified] : onychomycosis

## 2024-07-26 NOTE — ASSESSMENT
[FreeTextEntry1] : HIV f/u consult.  HIV stable   7/25/2024 Plan  HIV  1. continue current treatment - refills given  2. do blood work  3. f/u 3 months as per pt request 4. will defer MMR vaccine for now  Onychomycosis  1. referral to podiatry given   Tinea pedis 1. start Clotrimazole cream BID 2. keep feet open to air and wear socks with shoes  Arthralgia noted with various joint pains, inflammation and tenderness of bilat wrists and left elbow noted.   1. referral to orthopedist given         [Treatment Education] : treatment education [Treatment Adherence] : treatment adherence [Rx Dose / Side Effects] : Rx dose/side effects [Medical Care Issues] : medical care issues

## 2024-07-26 NOTE — PHYSICAL EXAM
[General Appearance - Alert] : alert [General Appearance - In No Acute Distress] : in no acute distress [General Appearance - Well Nourished] : well nourished [General Appearance - Well-Appearing] : healthy appearing [Sclera] : the sclera and conjunctiva were normal [PERRL With Normal Accommodation] : pupils were equal in size, round, reactive to light [Extraocular Movements] : extraocular movements were intact [Outer Ear] : the ears and nose were normal in appearance [Hearing Threshold Finger Rub Not [place]] : hearing was normal [Examination Of The Oral Cavity] : the lips and gums were normal [Both Tympanic Membranes Were Examined] : both tympanic membranes were normal [Neck Appearance] : the appearance of the neck was normal [Neck Cervical Mass (___cm)] : no neck mass was observed [Jugular Venous Distention Increased] : there was no jugular-venous distention [Thyroid Diffuse Enlargement] : the thyroid was not enlarged [Respiration, Rhythm And Depth] : normal respiratory rhythm and effort [Exaggerated Use Of Accessory Muscles For Inspiration] : no accessory muscle use [Auscultation Breath Sounds / Voice Sounds] : lungs were clear to auscultation bilaterally [Heart Rate And Rhythm] : heart rate was normal and rhythm regular [Heart Sounds] : normal S1 and S2 [Heart Sounds Gallop] : no gallops [Murmurs] : no murmurs [Heart Sounds Pericardial Friction Rub] : no pericardial rub [Edema] : there was no peripheral edema [Bowel Sounds] : normal bowel sounds [Abdomen Soft] : soft [Abdomen Tenderness] : non-tender [Costovertebral Angle Tenderness] : no CVA tenderness [Range of Motion to Joints] : range of motion to joints [Nail Clubbing] : no clubbing  or cyanosis of the fingernails [Motor Tone] : muscle strength and tone were normal [Skin Color & Pigmentation] : normal skin color and pigmentation [] : no rash [Skin Lesions] : no skin lesions [FreeTextEntry1] : noted with peeling and mildly excoriated skin between toes bilat and plantar feet.  Onychomycosis noted of right foot first digit.   [Cranial Nerves] : cranial nerves 2-12 were intact [Sensation] : the sensory exam was normal to light touch and pinprick [Motor Exam] : the motor exam was normal [No Focal Deficits] : no focal deficits [Oriented To Time, Place, And Person] : oriented to person, place, and time [Affect] : the affect was normal

## 2024-07-29 LAB
HIV1 RNA # SERPL NAA+PROBE: ABNORMAL
HIV1 RNA # SERPL NAA+PROBE: ABNORMAL COPIES/ML
VIRAL LOAD INTERP: NORMAL
VIRAL LOAD LOG: ABNORMAL LG COP/ML

## 2024-08-06 PROBLEM — B35.6 TINEA CRURIS: Status: ACTIVE | Noted: 2024-08-06

## 2024-08-12 DIAGNOSIS — Z23 ENCOUNTER FOR IMMUNIZATION: ICD-10-CM

## 2024-08-12 DIAGNOSIS — B20 HUMAN IMMUNODEFICIENCY VIRUS [HIV] DISEASE: ICD-10-CM

## 2024-08-13 ENCOUNTER — APPOINTMENT (OUTPATIENT)
Dept: INFECTIOUS DISEASE | Facility: CLINIC | Age: 49
End: 2024-08-13
Payer: COMMERCIAL

## 2024-08-13 ENCOUNTER — MED ADMIN CHARGE (OUTPATIENT)
Age: 49
End: 2024-08-13

## 2024-08-13 DIAGNOSIS — F55.3 ABUSE OF STEROIDS OR HORMONES: ICD-10-CM

## 2024-08-13 PROCEDURE — ZZZZZ: CPT

## 2024-08-13 RX ORDER — TRAZODONE HYDROCHLORIDE 50 MG/1
50 TABLET ORAL
Qty: 60 | Refills: 0 | Status: ACTIVE | COMMUNITY
Start: 2024-08-13 | End: 1900-01-01

## 2024-08-13 RX ORDER — ESCITALOPRAM OXALATE 5 MG/1
5 TABLET ORAL
Qty: 60 | Refills: 1 | Status: ACTIVE | COMMUNITY
Start: 2024-08-13 | End: 1900-01-01

## 2024-08-14 LAB
FOLATE SERPL-MCNC: 19.4 NG/ML
TSH SERPL-ACNC: 2.49 UIU/ML
VIT B12 SERPL-MCNC: 662 PG/ML

## 2024-08-28 ENCOUNTER — NON-APPOINTMENT (OUTPATIENT)
Age: 49
End: 2024-08-28

## 2024-09-11 NOTE — ED PROVIDER NOTE - PRINCIPAL DIAGNOSIS
The form was sent to the Physician's Nurse MSG Pool via In-Basket for review and signature.    Completed form will be faxed to [facility] at [phone].    Scrotal hematoma

## 2024-09-13 ENCOUNTER — APPOINTMENT (OUTPATIENT)
Dept: INFECTIOUS DISEASE | Facility: CLINIC | Age: 49
End: 2024-09-13

## 2024-09-20 NOTE — ED PROVIDER NOTE - NS ED MD EM SELECTION
[id number] Comprehensive
Pt here admits to ETOH use last night and for past 4 days. BIBA as per family has been drinking and not getting out of bed. abrasions to knees. Pt sclera appear jaundiced. .

## 2024-10-01 ENCOUNTER — APPOINTMENT (OUTPATIENT)
Dept: INFECTIOUS DISEASE | Facility: CLINIC | Age: 49
End: 2024-10-01

## 2024-10-11 ENCOUNTER — RX RENEWAL (OUTPATIENT)
Age: 49
End: 2024-10-11

## 2024-10-24 ENCOUNTER — OUTPATIENT (OUTPATIENT)
Dept: OUTPATIENT SERVICES | Facility: HOSPITAL | Age: 49
LOS: 1 days | End: 2024-10-24
Payer: COMMERCIAL

## 2024-10-24 ENCOUNTER — APPOINTMENT (OUTPATIENT)
Dept: INFECTIOUS DISEASE | Facility: CLINIC | Age: 49
End: 2024-10-24

## 2024-10-24 DIAGNOSIS — Z00.00 ENCOUNTER FOR GENERAL ADULT MEDICAL EXAMINATION WITHOUT ABNORMAL FINDINGS: ICD-10-CM

## 2024-10-24 PROCEDURE — G0008: CPT

## 2024-10-24 PROCEDURE — ZZZZZ: CPT

## 2024-10-24 PROCEDURE — 90656 IIV3 VACC NO PRSV 0.5 ML IM: CPT

## 2024-10-25 LAB
ALBUMIN SERPL ELPH-MCNC: 4.6 G/DL
ALP BLD-CCNC: 64 U/L
ALT SERPL-CCNC: 37 U/L
ANION GAP SERPL CALC-SCNC: 14 MMOL/L
AST SERPL-CCNC: 32 U/L
BILIRUB SERPL-MCNC: 0.4 MG/DL
BUN SERPL-MCNC: 11 MG/DL
CALCIUM SERPL-MCNC: 10.2 MG/DL
CD3 CELLS # BLD: 1190 CELLS/UL
CD3 CELLS NFR BLD: 79 %
CD3+CD4+ CELLS # BLD: 461 CELLS/UL
CD3+CD4+ CELLS NFR BLD: 31 %
CD3+CD4+ CELLS/CD3+CD8+ CLL SPEC: 0.66 RATIO
CD3+CD8+ CELLS # SPEC: 703 CELLS/UL
CD3+CD8+ CELLS NFR BLD: 47 %
CHLORIDE SERPL-SCNC: 100 MMOL/L
CO2 SERPL-SCNC: 28 MMOL/L
CREAT SERPL-MCNC: 1.31 MG/DL
EGFR: 68 ML/MIN/1.73M2
GLUCOSE SERPL-MCNC: 93 MG/DL
HCT VFR BLD CALC: 46.4 %
HGB BLD-MCNC: 16.5 G/DL
HIV1 RNA # SERPL NAA+PROBE: ABNORMAL
HIV1 RNA # SERPL NAA+PROBE: ABNORMAL COPIES/ML
MCHC RBC-ENTMCNC: 31.5 PG
MCHC RBC-ENTMCNC: 35.6 GM/DL
MCV RBC AUTO: 88.5 FL
PLATELET # BLD AUTO: 241 K/UL
POTASSIUM SERPL-SCNC: 4.8 MMOL/L
PROT SERPL-MCNC: 7.1 G/DL
RBC # BLD: 5.24 M/UL
RBC # FLD: 12.3 %
SODIUM SERPL-SCNC: 141 MMOL/L
VIRAL LOAD INTERP: NORMAL
VIRAL LOAD LOG: ABNORMAL LG COP/ML
WBC # FLD AUTO: 5.86 K/UL

## 2024-10-30 ENCOUNTER — OUTPATIENT (OUTPATIENT)
Dept: OUTPATIENT SERVICES | Facility: HOSPITAL | Age: 49
LOS: 1 days | End: 2024-10-30

## 2024-10-30 ENCOUNTER — APPOINTMENT (OUTPATIENT)
Dept: INFECTIOUS DISEASE | Facility: CLINIC | Age: 49
End: 2024-10-30

## 2024-10-30 DIAGNOSIS — B35.3 TINEA PEDIS: ICD-10-CM

## 2024-10-30 DIAGNOSIS — F41.9 ANXIETY DISORDER, UNSPECIFIED: ICD-10-CM

## 2024-10-30 DIAGNOSIS — B20 HUMAN IMMUNODEFICIENCY VIRUS [HIV] DISEASE: ICD-10-CM

## 2024-10-30 DIAGNOSIS — B35.1 TINEA UNGUIUM: ICD-10-CM

## 2024-10-30 DIAGNOSIS — J45.31 MILD PERSISTENT ASTHMA WITH (ACUTE) EXACERBATION: ICD-10-CM

## 2024-10-30 PROCEDURE — 99442: CPT

## 2024-10-30 PROCEDURE — G0463: CPT

## 2024-10-31 ENCOUNTER — NON-APPOINTMENT (OUTPATIENT)
Age: 49
End: 2024-10-31

## 2024-11-06 DIAGNOSIS — B20 HUMAN IMMUNODEFICIENCY VIRUS [HIV] DISEASE: ICD-10-CM

## 2024-11-11 ENCOUNTER — RX RENEWAL (OUTPATIENT)
Age: 49
End: 2024-11-11

## 2024-12-13 ENCOUNTER — RX RENEWAL (OUTPATIENT)
Age: 49
End: 2024-12-13

## 2025-01-13 ENCOUNTER — RX RENEWAL (OUTPATIENT)
Age: 50
End: 2025-01-13

## 2025-02-06 ENCOUNTER — APPOINTMENT (OUTPATIENT)
Dept: INFECTIOUS DISEASE | Facility: CLINIC | Age: 50
End: 2025-02-06

## 2025-02-10 ENCOUNTER — NON-APPOINTMENT (OUTPATIENT)
Age: 50
End: 2025-02-10

## 2025-02-11 ENCOUNTER — NON-APPOINTMENT (OUTPATIENT)
Age: 50
End: 2025-02-11

## 2025-02-26 ENCOUNTER — NON-APPOINTMENT (OUTPATIENT)
Age: 50
End: 2025-02-26

## 2025-03-06 ENCOUNTER — NON-APPOINTMENT (OUTPATIENT)
Age: 50
End: 2025-03-06

## 2025-03-12 ENCOUNTER — NON-APPOINTMENT (OUTPATIENT)
Age: 50
End: 2025-03-12

## 2025-04-30 ENCOUNTER — RX RENEWAL (OUTPATIENT)
Age: 50
End: 2025-04-30

## 2025-05-09 ENCOUNTER — NON-APPOINTMENT (OUTPATIENT)
Age: 50
End: 2025-05-09

## 2025-05-29 ENCOUNTER — RX RENEWAL (OUTPATIENT)
Age: 50
End: 2025-05-29

## 2025-06-25 ENCOUNTER — RX RENEWAL (OUTPATIENT)
Age: 50
End: 2025-06-25

## 2025-07-23 ENCOUNTER — NON-APPOINTMENT (OUTPATIENT)
Age: 50
End: 2025-07-23

## 2025-07-28 ENCOUNTER — RX RENEWAL (OUTPATIENT)
Age: 50
End: 2025-07-28

## 2025-08-28 ENCOUNTER — APPOINTMENT (OUTPATIENT)
Dept: INFECTIOUS DISEASE | Facility: CLINIC | Age: 50
End: 2025-08-28

## 2025-09-02 ENCOUNTER — APPOINTMENT (OUTPATIENT)
Dept: INFECTIOUS DISEASE | Facility: CLINIC | Age: 50
End: 2025-09-02

## 2025-09-02 ENCOUNTER — RX RENEWAL (OUTPATIENT)
Age: 50
End: 2025-09-02

## 2025-09-03 ENCOUNTER — APPOINTMENT (OUTPATIENT)
Dept: INFECTIOUS DISEASE | Facility: CLINIC | Age: 50
End: 2025-09-03

## 2025-09-03 ENCOUNTER — OUTPATIENT (OUTPATIENT)
Dept: OUTPATIENT SERVICES | Facility: HOSPITAL | Age: 50
LOS: 1 days | End: 2025-09-03
Payer: COMMERCIAL

## 2025-09-03 DIAGNOSIS — B20 HUMAN IMMUNODEFICIENCY VIRUS [HIV] DISEASE: ICD-10-CM

## 2025-09-03 PROBLEM — M79.605 PAIN OF LEFT LOWER EXTREMITY: Status: ACTIVE | Noted: 2025-09-03

## 2025-09-03 PROBLEM — A63.0 GENITAL WARTS: Status: ACTIVE | Noted: 2025-09-03

## 2025-09-03 PROBLEM — K04.7 TOOTH ABSCESS: Status: ACTIVE | Noted: 2025-09-03

## 2025-09-03 PROCEDURE — 99213 OFFICE O/P EST LOW 20 MIN: CPT | Mod: 93

## 2025-09-03 PROCEDURE — G0463: CPT

## 2025-09-03 RX ORDER — AMOXICILLIN 500 MG/1
500 TABLET, FILM COATED ORAL EVERY 8 HOURS
Qty: 21 | Refills: 0 | Status: ACTIVE | COMMUNITY
Start: 2025-09-03 | End: 1900-01-01

## 2025-09-09 ENCOUNTER — NON-APPOINTMENT (OUTPATIENT)
Age: 50
End: 2025-09-09

## 2025-09-09 ENCOUNTER — APPOINTMENT (OUTPATIENT)
Dept: INFECTIOUS DISEASE | Facility: CLINIC | Age: 50
End: 2025-09-09

## 2025-09-09 VITALS
SYSTOLIC BLOOD PRESSURE: 131 MMHG | HEIGHT: 69 IN | TEMPERATURE: 98.1 F | OXYGEN SATURATION: 98 % | DIASTOLIC BLOOD PRESSURE: 75 MMHG | BODY MASS INDEX: 26.96 KG/M2 | WEIGHT: 182 LBS | HEART RATE: 81 BPM

## 2025-09-09 PROCEDURE — G2211 COMPLEX E/M VISIT ADD ON: CPT

## 2025-09-09 PROCEDURE — 99214 OFFICE O/P EST MOD 30 MIN: CPT

## 2025-09-10 LAB
25(OH)D3 SERPL-MCNC: 86.2 NG/ML
ALBUMIN SERPL ELPH-MCNC: 4.6 G/DL
ALP BLD-CCNC: 64 U/L
ALT SERPL-CCNC: 26 U/L
ANION GAP SERPL CALC-SCNC: 14 MMOL/L
APPEARANCE: CLEAR
AST SERPL-CCNC: 26 U/L
BILIRUB SERPL-MCNC: 0.5 MG/DL
BILIRUBIN URINE: NEGATIVE
BLOOD URINE: NEGATIVE
BUN SERPL-MCNC: 15 MG/DL
CALCIUM SERPL-MCNC: 9.9 MG/DL
CD3 CELLS # BLD: 1010 CELLS/UL
CD3 CELLS NFR BLD: 84 %
CD3+CD4+ CELLS # BLD: 450 CELLS/UL
CD3+CD4+ CELLS NFR BLD: 37 %
CD3+CD4+ CELLS/CD3+CD8+ CLL SPEC: 0.83 RATIO
CD3+CD8+ CELLS # SPEC: 545 CELLS/UL
CD3+CD8+ CELLS NFR BLD: 45 %
CHLORIDE SERPL-SCNC: 100 MMOL/L
CHOLEST SERPL-MCNC: 127 MG/DL
CO2 SERPL-SCNC: 25 MMOL/L
COLOR: YELLOW
CREAT SERPL-MCNC: 1.13 MG/DL
EGFRCR SERPLBLD CKD-EPI 2021: 80 ML/MIN/1.73M2
ESTIMATED AVERAGE GLUCOSE: 100 MG/DL
GLUCOSE QUALITATIVE U: NEGATIVE MG/DL
GLUCOSE SERPL-MCNC: 115 MG/DL
HBA1C MFR BLD HPLC: 5.1 %
HBV DNA # SERPL NAA+PROBE: NOT DETECTED IU/ML
HCT VFR BLD CALC: 47 %
HCV AB SER QL: NONREACTIVE
HCV S/CO RATIO: 0.11 S/CO
HDLC SERPL-MCNC: 43 MG/DL
HEPB DNA PCR INT: NOT DETECTED
HEPB DNA PCR LOG: NOT DETECTED LOGIU/ML
HGB BLD-MCNC: 16.1 G/DL
HIV1 RNA # SERPL NAA+PROBE: ABNORMAL
HIV1 RNA # SERPL NAA+PROBE: ABNORMAL COPIES/ML
KETONES URINE: NEGATIVE MG/DL
LDLC SERPL-MCNC: 71 MG/DL
LEUKOCYTE ESTERASE URINE: NEGATIVE
MCHC RBC-ENTMCNC: 30.9 PG
MCHC RBC-ENTMCNC: 34.3 G/DL
MCV RBC AUTO: 90.2 FL
NITRITE URINE: NEGATIVE
NONHDLC SERPL-MCNC: 84 MG/DL
PH URINE: 7
PLATELET # BLD AUTO: 239 K/UL
POTASSIUM SERPL-SCNC: 4.5 MMOL/L
PROT SERPL-MCNC: 6.9 G/DL
PROTEIN URINE: NEGATIVE MG/DL
PSA SERPL-MCNC: 0.63 NG/ML
RBC # BLD: 5.21 M/UL
RBC # FLD: 12.4 %
SODIUM SERPL-SCNC: 139 MMOL/L
SPECIFIC GRAVITY URINE: 1
T PALLIDUM AB SER QL IA: NEGATIVE
TRIGL SERPL-MCNC: 64 MG/DL
TSH SERPL-ACNC: 1.47 UIU/ML
UROBILINOGEN URINE: 0.2 MG/DL
VIABILITY: NORMAL
VIRAL LOAD INTERP: NORMAL
VIRAL LOAD LOG: ABNORMAL LG COP/ML
WBC # FLD AUTO: 4.48 K/UL

## 2025-09-11 DIAGNOSIS — A63.0 ANOGENITAL (VENEREAL) WARTS: ICD-10-CM

## 2025-09-11 DIAGNOSIS — M79.605 PAIN IN LEFT LEG: ICD-10-CM

## 2025-09-11 DIAGNOSIS — K04.7 PERIAPICAL ABSCESS WITHOUT SINUS: ICD-10-CM

## 2025-09-11 LAB
C TRACH RRNA SPEC QL NAA+PROBE: NOT DETECTED
N GONORRHOEA RRNA SPEC QL NAA+PROBE: NOT DETECTED
SOURCE AMPLIFICATION: NORMAL
SOURCE AMPLIFICATION: NORMAL
SOURCE ANAL: NORMAL
SOURCE ORAL: NORMAL
T VAGINALIS RRNA SPEC QL NAA+PROBE: NOT DETECTED

## 2025-09-15 ENCOUNTER — NON-APPOINTMENT (OUTPATIENT)
Age: 50
End: 2025-09-15

## 2025-09-17 ENCOUNTER — APPOINTMENT (OUTPATIENT)
Dept: INFECTIOUS DISEASE | Facility: CLINIC | Age: 50
End: 2025-09-17
Payer: COMMERCIAL

## 2025-09-17 DIAGNOSIS — K04.7 PERIAPICAL ABSCESS W/OUT SINUS: ICD-10-CM

## 2025-09-17 DIAGNOSIS — A63.0 ANOGENITAL (VENEREAL) WARTS: ICD-10-CM

## 2025-09-17 DIAGNOSIS — B20 HUMAN IMMUNODEFICIENCY VIRUS [HIV] DISEASE: ICD-10-CM

## 2025-09-17 DIAGNOSIS — M79.605 PAIN IN LEFT LEG: ICD-10-CM

## 2025-09-17 PROCEDURE — 99212 OFFICE O/P EST SF 10 MIN: CPT | Mod: 95

## 2025-09-25 PROBLEM — Z23 ENCOUNTER FOR IMMUNIZATION: Status: ACTIVE | Noted: 2024-08-12 | Resolved: 2025-10-09
